# Patient Record
Sex: MALE | Race: BLACK OR AFRICAN AMERICAN | Employment: FULL TIME | URBAN - METROPOLITAN AREA
[De-identification: names, ages, dates, MRNs, and addresses within clinical notes are randomized per-mention and may not be internally consistent; named-entity substitution may affect disease eponyms.]

---

## 2018-02-25 ENCOUNTER — OFFICE VISIT (OUTPATIENT)
Dept: URGENT CARE | Facility: URGENT CARE | Age: 43
End: 2018-02-25
Payer: COMMERCIAL

## 2018-02-25 VITALS
HEART RATE: 75 BPM | DIASTOLIC BLOOD PRESSURE: 71 MMHG | TEMPERATURE: 97.3 F | WEIGHT: 219.5 LBS | OXYGEN SATURATION: 98 % | SYSTOLIC BLOOD PRESSURE: 120 MMHG | BODY MASS INDEX: 28.66 KG/M2

## 2018-02-25 DIAGNOSIS — R21 RASH: Primary | ICD-10-CM

## 2018-02-25 PROCEDURE — 99213 OFFICE O/P EST LOW 20 MIN: CPT | Performed by: FAMILY MEDICINE

## 2018-02-25 RX ORDER — CEPHALEXIN 500 MG/1
500 CAPSULE ORAL 3 TIMES DAILY
Qty: 21 CAPSULE | Refills: 0 | Status: SHIPPED | OUTPATIENT
Start: 2018-02-25 | End: 2018-03-04

## 2018-02-25 NOTE — NURSING NOTE
"Chief Complaint   Patient presents with     Musculoskeletal Problem     Pain in both legs from the knee down, started last Sunday, bump on left ankle last week, then got worse, right leg started with pain three days ago, both lower legs are itchy, irritated and swollen, can feel bumps in under the surface of the skin, was in [place] for 6 days, there Feb 16-22nd       Initial /71 (BP Location: Left arm, Patient Position: Chair, Cuff Size: Adult Regular)  Pulse 75  Temp 97.3  F (36.3  C) (Oral)  Wt 219 lb 8 oz (99.6 kg)  SpO2 98%  BMI 28.66 kg/m2 Estimated body mass index is 28.66 kg/(m^2) as calculated from the following:    Height as of 10/20/16: 6' 1.39\" (1.864 m).    Weight as of this encounter: 219 lb 8 oz (99.6 kg).  Medication Reconciliation: complete   Izzy Mendenhall MA    "

## 2018-02-25 NOTE — MR AVS SNAPSHOT
"              After Visit Summary   2018    Olivier Cheema    MRN: [id number]           Patient Information     Date Of Birth          1975        Visit Information        Provider Department      2018 1:25 PM Denise Cottrell MD [facility]        Today's Diagnoses     Rash    -  1       Follow-ups after your visit        Who to contact     If you have questions or need follow up information about today's clinic visit or your schedule please contact [facility] directly at [phone].  Normal or non-critical lab and imaging results will be communicated to you by Xanodynehart, letter or phone within 4 business days after the clinic has received the results. If you do not hear from us within 7 days, please contact the clinic through Litigaint or phone. If you have a critical or abnormal lab result, we will notify you by phone as soon as possible.  Submit refill requests through [employer] or call your pharmacy and they will forward the refill request to us. Please allow 3 business days for your refill to be completed.          Additional Information About Your Visit        MyChart Information     [employer] lets you send messages to your doctor, view your test results, renew your prescriptions, schedule appointments and more. To sign up, go to [email or web address] . Click on \"Log in\" on the left side of the screen, which will take you to the Welcome page. Then click on \"Sign up Now\" on the right side of the page.     You will be asked to enter the access code listed below, as well as some personal information. Please follow the directions to create your username and password.     Your access code is: [email or web address]  Expires: 2018  2:05 PM     Your access code will  in 90 days. If you need help or a new code, please call your [facility] or [phone].        Care EveryWhere ID     This is your Care EveryWhere ID. This could be used by other " organizations to access your [facility] medical records  [id number]        Your Vitals Were     Pulse Temperature Pulse Oximetry BMI (Body Mass Index)          75 97.3  F (36.3  C) (Oral) 98% 28.66 kg/m2         Blood Pressure from Last 3 Encounters:   02/25/18 120/71   10/20/16 122/78    Weight from Last 3 Encounters:   02/25/18 219 lb 8 oz (99.6 kg)   10/20/16 220 lb 12.8 oz (100.2 kg)              Today, you had the following     No orders found for display         Today's Medication Changes          These changes are accurate as of 2/25/18  2:05 PM.  If you have any questions, ask your nurse or doctor.               Start taking these medicines.        Dose/Directions    cephALEXin 500 MG capsule   Commonly known as:  KEFLEX   Used for:  Rash   Started by:  Denise Cottrell MD        Dose:  500 mg   Take 1 capsule (500 mg) by mouth 3 times daily for 7 days   Quantity:  21 capsule   Refills:  0            Where to get your medicines      These medications were sent to [facility] [address] [place] [address] [address] [facility]  [address] [facility] [address]    Hours:  24-hours Phone:  [phone]     cephALEXin 500 MG capsule                Primary Care Provider Office Phone # Fax #    Wilfred Elliott [phone]       [address]  [place] [address]        Equal Access to Services     [facility] AH: Hadii aad ku hadasho Soomaali, waaxda luqadaha, qaybta kaalmada adeegyada, waxay viv allen . So [facility] [phone].    ATENCIÓN: Si habla español, tiene a noriega disposición servicios gratuitos de asistencia lingüística. Llame al [phone].    We comply with applicable federal civil rights laws and [place] laws. We do not discriminate on the basis of race, color, national origin, age, disability, sex, sexual orientation, or gender identity.            Thank you!     Thank you for choosing [facility]  for your care. Our goal is always to provide you with excellent care. Hearing back from our patients is one way we can continue to improve our services. Please take a few minutes to complete the written survey that you may receive in the mail after your visit with us. Thank you!             Your Updated Medication List - Protect others around you: Learn how to safely use, store and throw away your medicines at www.disposemymeds.org.          This list is accurate as of 2/25/18  2:05 PM.  Always use your most recent med list.                   Brand Name Dispense Instructions for use Diagnosis    cephALEXin 500 MG capsule    KEFLEX    21 capsule    Take 1 capsule (500 mg) by mouth 3 times daily for 7 days    Rash

## 2018-02-25 NOTE — PROGRESS NOTES
Chief Complaint   Patient presents with     Musculoskeletal Problem     Pain in both legs from the knee down, started last Sunday, bump on left ankle last week, then got worse, right leg started with pain three days ago, both lower legs are itchy, irritated and swollen, can feel bumps in under the surface of the skin, was in [place] for 6 days, there Feb 16-22nd, indicated it hurts to walk      ADDITIONAL HPI: 42 year old male here for above issue.  He did spend time walking on the beach but not in the water.    ROS: 10 point review of systems negative except as per HPI.    PAST MEDICAL HISTORY:  No past medical history on file.     ACTIVE MEDICAL PROBLEMS:  Patient Active Problem List   Diagnosis     CARDIOVASCULAR SCREENING; LDL GOAL LESS THAN 160        FAMILY HISTORY:  No family history on file.    SOCIAL HISTORY:  Social History     Social History     Marital status: Single     Spouse name: N/A     Number of children: N/A     Years of education: N/A     Occupational History     Not on file.     Social History Main Topics     Smoking status: Current Every Day Smoker     Smokeless tobacco: Never Used     Alcohol use No     Drug use: No     Sexual activity: Not on file     Other Topics Concern     Not on file     Social History Narrative       MEDICATIONS:  Current Outpatient Prescriptions   Medication Sig Dispense Refill     cephALEXin (KEFLEX) 500 MG capsule Take 1 capsule (500 mg) by mouth 3 times daily for 7 days 21 capsule 0       ALLERGIES:   No Known Allergies    Problem list, Medication list, Allergies, and Medical/Social/Surgical histories reviewed in EPIC and updated as appropriate.    OBJECTIVE:                                                    VITALS: /71 (BP Location: Left arm, Patient Position: Chair, Cuff Size: Adult Regular)  Pulse 75  Temp 97.3  F (36.3  C) (Oral)  Wt 219 lb 8 oz (99.6 kg)  SpO2 98%  BMI 28.66 kg/m2 Body mass index is 28.66 kg/(m^2).  GENERAL: Pleasant, well appearing  male.  SKIN:  Tender erythematous nodules with small central brown spot/superficial ulcer.    ASSESSMENT/PLAN:                                                    1. Rash  Suspect infected sand flea bites. Cover lower legs but not on feet or ankles - which were covered with socks while walking on the beach.  Treat with keflex. Can use topical cortaid cream as well. Typically is self limited so should resolve. Follow-up if not improving or if worsening.   - cephALEXin (KEFLEX) 500 MG capsule; Take 1 capsule (500 mg) by mouth 3 times daily for 7 days  Dispense: 21 capsule; Refill: 0

## 2018-02-25 NOTE — LETTER
[facility]  [address]  [facility] [address]  Phone: [phone]    February 25, 2018        Olivier Cheema  [address]   [place] [address]          To whom it may concern:    RE: Olivier Cheema    Patient was seen and treated today at our clinic and missed work 2/26/18.    Please contact me for questions or concerns.      Sincerely,        Denise Cottrell MD

## 2019-04-24 ENCOUNTER — OFFICE VISIT (OUTPATIENT)
Dept: FAMILY MEDICINE | Facility: CLINIC | Age: 44
End: 2019-04-24
Payer: COMMERCIAL

## 2019-04-24 VITALS
RESPIRATION RATE: 18 BRPM | BODY MASS INDEX: 27.94 KG/M2 | SYSTOLIC BLOOD PRESSURE: 122 MMHG | HEART RATE: 89 BPM | TEMPERATURE: 98 F | OXYGEN SATURATION: 97 % | WEIGHT: 214 LBS | DIASTOLIC BLOOD PRESSURE: 70 MMHG

## 2019-04-24 DIAGNOSIS — M54.6 PAIN IN THORACIC SPINE: Primary | ICD-10-CM

## 2019-04-24 PROCEDURE — 99213 OFFICE O/P EST LOW 20 MIN: CPT | Performed by: PHYSICIAN ASSISTANT

## 2019-04-24 RX ORDER — CYCLOBENZAPRINE HCL 5 MG
5 TABLET ORAL 3 TIMES DAILY PRN
Qty: 12 TABLET | Refills: 0 | Status: SHIPPED | OUTPATIENT
Start: 2019-04-24 | End: 2021-03-30

## 2019-04-24 RX ORDER — NAPROXEN 500 MG/1
500 TABLET ORAL 2 TIMES DAILY WITH MEALS
Qty: 30 TABLET | Refills: 1 | Status: SHIPPED | OUTPATIENT
Start: 2019-04-24 | End: 2021-03-30

## 2019-04-24 RX ORDER — NAPROXEN 500 MG/1
500 TABLET ORAL 2 TIMES DAILY WITH MEALS
Qty: 30 TABLET | Refills: 1 | Status: SHIPPED | OUTPATIENT
Start: 2019-04-24 | End: 2019-04-24

## 2019-04-24 RX ORDER — CYCLOBENZAPRINE HCL 5 MG
5 TABLET ORAL 3 TIMES DAILY PRN
Qty: 12 TABLET | Refills: 0 | Status: SHIPPED | OUTPATIENT
Start: 2019-04-24 | End: 2019-04-24

## 2019-04-24 ASSESSMENT — PAIN SCALES - GENERAL: PAINLEVEL: MODERATE PAIN (4)

## 2019-04-24 NOTE — PROGRESS NOTES
SUBJECTIVE:   Olivier Cheema is a 43 year old male who presents to clinic today for the following   health issues:      Back Pain       Duration: x 1 week        Specific cause: none    Description:   Location of pain: middle of back left  Character of pain: dull ache  Pain radiation:none  New numbness or weakness in legs, not attributed to pain:  no     Intensity: moderate    History:   Pain interferes with job: No  History of back problems: no prior back problems  Any previous MRI or X-rays: None  Sees a specialist for back pain:  No  Therapies tried without relief: ibuprofen, cold heat      Alleviating factors:     Improved by: nothing    Precipitating factors:  Worsened by: Bending and Lying Flat        Accompanying Signs & Symptoms:  Risk of Fracture:  None  Risk of Cauda Equina:  None  Risk of Infection:  None  Risk of Cancer:  None  Risk of Ankylosing Spondylitis:  Onset at age <35, male, AND morning back stiffness. 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  Patient localizes pain to L back below scapula.     ROS:  Constitutional, HEENT, cardiovascular, pulmonary, gi and gu systems are negative, except as otherwise noted.    OBJECTIVE:     /70   Pulse 89   Temp 98  F (36.7  C) (Oral)   Resp 18   Wt 97.1 kg (214 lb)   SpO2 97%   BMI 27.94 kg/m    Body mass index is 27.94 kg/m .  RESP/Chest: lungs clear to auscultation - no rales, rhonchi or wheezes, No pain with palpation.    MS: no gross musculoskeletal defects noted, no edema  SKIN: no suspicious lesions or rashes    Diagnostic Test Results:  none     ASSESSMENT/PLAN:   1. Pain in thoracic spine  - cyclobenzaprine (FLEXERIL) 5 MG tablet; Take 1 tablet (5 mg) by mouth 3 times daily as needed for muscle spasms  Dispense: 12 tablet; Refill: 0  - naproxen (NAPROSYN) 500 MG tablet; Take 1 tablet (500 mg) by mouth 2 times daily (with meals)  Dispense: 30 tablet; Refill: 1    Ice 20 min 3 x  daily  Use medication as directed.  Follow up if symptoms should persist, change or worsen.  Patient amenable to this follow up plan.     Delvin Young PA-C  [facility]

## 2019-04-24 NOTE — PATIENT INSTRUCTIONS
Patient Education     Back Pain (Acute or Chronic)    Back pain is one of the most common problems. The good news is that most people feel better in 1 to 2 weeks, and most of the rest in 1 to 2 months. Most people can remain active.  People experience and describe pain differently; not everyone is the same.    The pain can be sharp, stabbing, shooting, aching, cramping or burning.    Movement, standing, bending, lifting, sitting, or walking may worsen pain.    It can be localized to one spot or area, or it can be more generalized.    It can spread or radiate upwards, to the front, or go down your arms or legs (sciatica).    It can cause muscle spasm.  Most of the time, mechanical problems with the muscles or spine cause the pain. Mechanical problems are usually caused by an injury to the muscles or ligaments. While illness can cause back pain, it is usually not caused by a serious illness. Mechanical problems include:     Physical activity such as sports, exercise, work, or normal activity    Overexertion, lifting, pushing, pulling incorrectly or too aggressively    Sudden twisting, bending, or stretching from an accident, or accidental movement    Poor posture    Stretching or moving wrong, without noticing pain at the time    Poor coordination, lack of regular exercise (check with your doctor about this)    Spinal disc disease or arthritis    Stress  Pain can also be related to pregnancy, or illness like appendicitis, bladder or kidney infections, pelvic infections, and many other things.  Acute back pain usually gets better in 1 to 2 weeks. Back pain related to disk disease, arthritis in the spinal joints or spinal stenosis (narrowing of the spinal canal) can become chronic and last for months or years.  Unless you had a physical injury (for example, a car accident or fall) X-rays are usually not needed for the initial evaluation of back pain. If pain continues and does not respond to medical treatment, X-rays  and other tests may be needed.  Home care  Try these home care recommendations:    When in bed, try to find a position of comfort. A firm mattress is best. Try lying flat on your back with pillows under your knees. You can also try lying on your side with your knees bent up towards your chest and a pillow between your knees.    At first, do not try to stretch out the sore spots. If there is a strain, it is not like the good soreness you get after exercising without an injury. In this case, stretching may make it worse.    Avoid prolong sitting, long car rides, or travel. This puts more stress on the lower back than standing or walking.    During the first 24 to 72 hours after an acute injury or flare up of chronic back pain, apply an ice pack to the painful area for 20 minutes and then remove it for 20 minutes. Do this over a period of 60 to 90 minutes or several times a day. This will reduce swelling and pain. Wrap the ice pack in a thin towel or plastic to protect your skin.    You can start with ice, then switch to heat. Heat (hot shower, hot bath, or heating pad) reduces pain and works well for muscle spasms. Heat can be applied to the painful area for 20 minutes then remove it for 20 minutes. Do this over a period of 60 to 90 minutes or several times a day. Do not sleep on a heating pad. It can lead to skin burns or tissue damage.    You can alternate ice and heat therapy. Talk with your doctor about the best treatment for your back pain.    Therapeutic massage can help relax the back muscles without stretching them.    Be aware of safe lifting methods and do not lift anything without stretching first.  Medicines  Talk to your doctor before using medicine, especially if you have other medical problems or are taking other medicines.    You may use over-the-counter medicine as directed on the bottle to control pain, unless another pain medicine was prescribed. If you have chronic conditions like diabetes, liver  or kidney disease, stomach ulcers, or gastrointestinal bleeding, or are taking blood thinners, talk to your doctor before taking any medicine.    Be careful if you are given a prescription medicines, narcotics, or medicine for muscle spasms. They can cause drowsiness, affect your coordination, reflexes, and judgement. Do not drive or operate heavy machinery.  Follow-up care  Follow up with your healthcare provider, or as advised.   A radiologist will review any X-rays that were taken. Your provide will notify you of any new findings that may affect your care.  Call 911  Call emergency services if any of the following occur:    Trouble breathing    Confusion    Very drowsy or trouble awakening    Fainting or loss of consciousness    Rapid or very slow heart rate    Loss of bowel or bladder control  When to seek medical advice  Call your healthcare provider right away if any of these occur:     Pain becomes worse or spreads to your legs    Weakness or numbness in one or both legs    Numbness in the groin or genital area  Date Last Reviewed: 7/1/2016 2000-2017 [employer]. [address] [place] [address]. All rights reserved. This information is not intended as a substitute for professional medical care. Always follow your healthcare professional's instructions.

## 2019-08-23 ENCOUNTER — OFFICE VISIT (OUTPATIENT)
Dept: FAMILY MEDICINE | Facility: CLINIC | Age: 44
End: 2019-08-23
Payer: COMMERCIAL

## 2019-08-23 VITALS
SYSTOLIC BLOOD PRESSURE: 108 MMHG | DIASTOLIC BLOOD PRESSURE: 70 MMHG | OXYGEN SATURATION: 99 % | HEART RATE: 74 BPM | TEMPERATURE: 98.5 F | WEIGHT: 230 LBS | BODY MASS INDEX: 30.03 KG/M2

## 2019-08-23 DIAGNOSIS — B34.9 VIRAL SYNDROME: Primary | ICD-10-CM

## 2019-08-23 DIAGNOSIS — H61.23 BILATERAL IMPACTED CERUMEN: ICD-10-CM

## 2019-08-23 DIAGNOSIS — F17.200 TOBACCO USE DISORDER: ICD-10-CM

## 2019-08-23 PROBLEM — E04.9 GOITER: Status: ACTIVE | Noted: 2019-08-23

## 2019-08-23 PROCEDURE — 90715 TDAP VACCINE 7 YRS/> IM: CPT | Performed by: NURSE PRACTITIONER

## 2019-08-23 PROCEDURE — 90471 IMMUNIZATION ADMIN: CPT | Performed by: NURSE PRACTITIONER

## 2019-08-23 PROCEDURE — 69209 REMOVE IMPACTED EAR WAX UNI: CPT | Mod: 50 | Performed by: NURSE PRACTITIONER

## 2019-08-23 PROCEDURE — 99213 OFFICE O/P EST LOW 20 MIN: CPT | Mod: 25 | Performed by: NURSE PRACTITIONER

## 2019-08-23 ASSESSMENT — PAIN SCALES - GENERAL: PAINLEVEL: NO PAIN (0)

## 2019-08-23 NOTE — PATIENT INSTRUCTIONS
"  Patient Education     Viral Syndrome (Adult)  A viral illness may cause a number of symptoms such as fever. Other symptoms depend on the part of the body that the virus affects. If it settles in your nose, throat, and lungs, it may cause cough, sore throat, congestion, runny nose, headache, earache and other ear symptoms, or shortness of breath. If it settles in your stomach and intestinal tract, it may cause nausea, vomiting, cramping, and diarrhea. Sometimes it causes generalized symptoms like \"aching all over,\" feeling tired, loss of energy, or loss of appetite.  A viral illness usually lasts anywhere from several days to several weeks, but sometimes it lasts longer. In some cases, a more serious infection can look like a viral syndrome in the first few days of the illness. You may need another exam and additional tests to know the difference. Watch for the warning signs listed below for when to seek medical advice.  Home care  Follow these guidelines for taking care of yourself at home:    If symptoms are severe, rest at home for the first 2 to 3 days.    Stay away from cigarette smoke - both your smoke and the smoke from others.    You may use over-the-counter acetaminophen or ibuprofen for fever, muscle aching, and headache, unless another medicine was prescribed for this. If you have chronic liver or kidney disease or ever had a stomach ulcer or gastrointestinal bleeding, talk with your healthcare provider before using these medicines. No one who is younger than 18 and ill with a fever should take aspirin. It may cause severe disease or death.    Your appetite may be poor, so a light diet is fine. Avoid dehydration by drinking 8 to 12, 8-ounce glasses of fluids each day. This may include water; orange juice; lemonade; apple, grape, and cranberry juice; clear fruit drinks; electrolyte replacement and sports drinks; and decaffeinated teas and coffee. If you have been diagnosed with a kidney disease, ask your " healthcare provider how much and what types of fluids you should drink to prevent dehydration. If you have kidney disease, drinking too much fluid can cause it build up in the your body and be dangerous to your health.    Over-the-counter remedies won't shorten the length of the illness but may be helpful for symptoms such as cough, sore throat, nasal and sinus congestion, or diarrhea. Don't use decongestants if you have high blood pressure.  Follow-up care  Follow up with your healthcare provider if you do not improve over the next week.  Call 911  Call 911 if any of the following occur:    Convulsion    Feeling weak, dizzy, or like you are going to faint    Chest pain, or more than mild shortness of breath  When to seek medical advice  Call your healthcare provider right away if any of these occur:    Cough with lots of colored sputum (mucus) or blood in your sputum    Chest pain, shortness of breath, wheezing, or trouble breathing    Severe headache; face, neck, or ear pain    Severe, constant pain in the lower right side of your belly (abdominal)    Continued vomiting (can t keep liquids down)    Frequent diarrhea (more than 5 times a day); blood (red or black color) or mucus in diarrhea    Feeling weak, dizzy, or like you are going to faint    Extreme thirst    Fever of 100.4 F (38 C) or higher, or as directed by your healthcare provider  Date Last Reviewed: 4/1/2018 2000-2018 [employer]. [address] [place] [address]. All rights reserved. This information is not intended as a substitute for professional medical care. Always follow your healthcare professional's instructions.           Afrin nasal spray to decongest the nose    [facility]     Discharged by : Nuzhat Gaona CMA     If you have any questions regarding your visit please contact your care team:     Team [facility]              Clinic Hours Telephone Number     Dr. Navneet Gustafson  Clarence Florentino, CNP   7am-7pm  Monday - Thursday   7am-5pm  Fridays  [phone]   (Appointment scheduling available 24/7)     RN Line  [phone] option 2     Urgent [facility] - [place] - 11am-9pm Monday-Friday Saturday-Sunday- 9am-5pm     [place] -   5pm-9pm Monday-Friday Saturday-Sunday- 9am-5pm    [phone] - [place]    [phone] - [place]     For a Price Quote for your services, please call our Consumer Price Line at [phone].     What options do I have for visits at the clinic other than the traditional office visit?     To expand how we care for you, many of our providers are utilizing electronic visits (e-visits) and telephone visits, when medically appropriate, for interactions with their patients rather than a visit in the clinic. We also offer nurse visits for many medical concerns. Just like any other service, we will bill your insurance company for this type of visit based on time spent on the phone with your provider. Not all insurance companies cover these visits. Please check with your medical insurance if this type of visit is covered. You will be responsible for any charges that are not paid by your insurance.     E-visits via [employer]: generally incur a $45.00 fee.     Telephone visits:  Time spent on the phone: *charged based on time that is spent on the phone in increments of 10 minutes. Estimated cost:   5-10 mins $30.00   11-20 mins. $59.00   21-30 mins. $85.00       Use [employer] (secure email communication and access to your chart) to send your primary care provider a message or make an appointment. Ask someone on your Team how to sign up for [employer].     As always, Thank you for trusting us with your health care needs!      [facility]:    Scheduling Appointments  [facility]  Call: [phone]    [facility]  Call: [phone]    [facility]  Locations  Call: [phone]    For Gastroenterology referrals   [facility]   Clinics and Surgery Center, 4th Floor   [address] [place] [address]   Appointments: [phone]    WHERE TO GO FOR CARE?    Clinic    Make an appointment if you:       Are sick (cold, cough, flu, sore throat, earache or in pain).       Have a small injury (sprain, small cut, burn or broken bone).       Need a physical exam, Pap smear, vaccine or prescription refill.       Have questions about your health or medicines.    To reach us:      Call [phone] ([phone]). Open 24 hours every day. (For counseling services, call [phone].)    Log into [employer]. (Visit [employer] to create an account.) Hospital emergency room    An emergency is a serious or life- threatening problem that must be treated right away.    Call 953 or get to the hospital if you have:      Very bad or sudden:            - Chest pain or pressure         - Bleeding         - Head or belly pain         - Dizziness or trouble seeing, walking or                          Speaking      Problems breathing      Blood in your vomit or you are coughing up blood      A major injury (knocked out, loss of a finger or limb, rape, broken bone protruding from skin)    A mental health crisis. (Or call the Mental Health Crisis line at 1-925.874.5421 or Suicide Prevention Hotline at 1-134.585.5289.)    Open 24 hours every day. You don't need an appointment.     Urgent care    Visit urgent care for sickness or small injuries when the clinic is closed. You don't need an appointment. To check hours or find an urgent care near you, visit [email or web address]. Online care    Get online care from [facility] for more than 70 common problems, like colds, allergies and infections. Open 24 hours every day at:   [email or web address]   Need help deciding?    For advice about where to be seen, you may call your clinic and ask to speak with  a nurse. We're here for you 24 hours every day.         If you are deaf or hard of hearing, please let us know. We provide many free services including sign language interpreters, oral interpreters, TTYs, telephone amplifiers, note takers and written materials.

## 2019-08-23 NOTE — NURSING NOTE
Bilateral ear wash done.     Screening Questionnaire for Adult Immunization    Are you sick today?   No   Do you have allergies to medications, food, a vaccine component or latex?   No   Have you ever had a serious reaction after receiving a vaccination?   No   Do you have a long-term health problem with heart disease, lung disease, asthma, kidney disease, metabolic disease (e.g. diabetes), anemia, or other blood disorder?   No   Do you have cancer, leukemia, HIV/AIDS, or any other immune system problem?   No   In the past 3 months, have you taken medications that affect  your immune system, such as prednisone, other steroids, or anticancer drugs; drugs for the treatment of rheumatoid arthritis, Crohn s disease, or psoriasis; or have you had radiation treatments?   No   Have you had a seizure, or a brain or other nervous system problem?   No   During the past year, have you received a transfusion of blood or blood     products, or been given immune (gamma) globulin or antiviral drug?   No   For women: Are you pregnant or is there a chance you could become        pregnant during the next month?   No   Have you received any vaccinations in the past 4 weeks?   No     Immunization questionnaire answers were all negative.        Per orders of. DREA Odonnell, injection of Tdap  given by Nuzhat Gaona MA. Patient instructed to remain in clinic for 15 minutes afterwards, and to report any adverse reaction to me immediately.       Screening performed by Nuzhat Gaona MA on 8/23/2019 at 11:47 AM.      Nuzhat Gaona CMA

## 2019-08-23 NOTE — LETTER
[facility]  [address] [place] [id number]  [phone]          August 23, 2019    RE:  Olivier NOE Deni                                                                                                                                                       [address]  [place] [address]            To whom it may concern:    Olivier NOE Dein is under my professional care and was seen by me today 8/23/19.  Please excuse Olivier from work 8/22/19 and 8/23/19.      Sincerely,             Juliana Florentino, DNP, APRN, CNP

## 2019-08-23 NOTE — PROGRESS NOTES
Subjective     Olivier Cheema is a 44 year old male who presents to clinic today for the following health issues:    HPI   ENT Symptoms             Symptoms: cc Present Absent Comment   Fever/Chills  x     Fatigue  x     Muscle Aches  x     Eye Irritation   x    Sneezing  x     Nasal Idris/Drg  x     Sinus Pressure/Pain  x     Loss of smell  x     Dental pain  x     Sore Throat  x  Scratchy not painful   Swollen Glands   x    Ear Pain/Fullness  x  Left ear feels cloudy   Cough  x     Wheeze  x  A little bit late at night   Chest Pain   x    Shortness of breath  x  At times, hard to breath through the nose.  Mouth breather today.   Rash   x    Other  x  Vomiting- could not keep foods down well     Symptom duration:  5 days   Symptom severity:  Mild   Treatments tried:  cough drops, cold medicine (generic of dayquil)   Contacts:  yes- wife with similar symptoms.  8 month old child had symptoms but when evaluated at the clinic was told it was allergies     Started with congestion.  Stayed home Monday, half day Wednesday, then skipped work yesterday and plans to skip today.  Works 3-11:30 pm.  Body aches, chills at night.  A little phlegm he coughed up this morning.  Feel worse each day.    Patient Active Problem List   Diagnosis     CARDIOVASCULAR SCREENING; LDL GOAL LESS THAN 160     Goiter     Tobacco use disorder     History reviewed. No pertinent surgical history.    Social History     Tobacco Use     Smoking status: Current Every Day Smoker     Smokeless tobacco: Never Used   Substance Use Topics     Alcohol use: No     Family History   Problem Relation Age of Onset     No Known Problems Mother      Throat cancer Father      Asthma Other         cousin     Lung Cancer Maternal Uncle         smoker     Thyroid Disease No family hx of      Prostate Cancer No family hx of          Current Outpatient Medications   Medication Sig Dispense Refill     cyclobenzaprine (FLEXERIL) 5 MG tablet Take 1 tablet (5 mg) by  mouth 3 times daily as needed for muscle spasms (Patient not taking: Reported on 8/23/2019) 12 tablet 0     naproxen (NAPROSYN) 500 MG tablet Take 1 tablet (500 mg) by mouth 2 times daily (with meals) (Patient not taking: Reported on 8/23/2019) 30 tablet 1     No Known Allergies  BP Readings from Last 3 Encounters:   08/23/19 108/70   04/24/19 122/70   02/25/18 120/71    Wt Readings from Last 3 Encounters:   08/23/19 104.3 kg (230 lb)   04/24/19 97.1 kg (214 lb)   02/25/18 99.6 kg (219 lb 8 oz)                    Reviewed and updated as needed this visit by Provider  Allergies  Meds  Problems  Med Hx  Surg Hx         Review of Systems   ROS COMP: Constitutional, HEENT, cardiovascular, pulmonary, gi and gu systems are negative, except as otherwise noted.      Objective    /70 (BP Location: Right arm, Patient Position: Sitting, Cuff Size: Adult Large)   Pulse 74   Temp 98.5  F (36.9  C) (Oral)   Wt 104.3 kg (230 lb)   SpO2 99%   BMI 30.03 kg/m    Body mass index is 30.03 kg/m .  Physical Exam   GENERAL: healthy, alert, no distress and over weight  EYES: Eyes grossly normal to inspection, PERRL and conjunctivae and sclerae normal  HENT: normal cephalic/atraumatic, both ears: occluded with wax.  Post ear irrigation by medical assistant the canals were clear of cerumen and TMs were dull without erythema, nose and mouth without ulcers or lesions, rhinorrhea clear, oropharynx clear and oral mucous membranes moist.  No tenderness of frontal and maxillary sinuses  NECK: no adenopathy, no asymmetry, masses, or scars and goiter  RESP: lungs clear to auscultation - no rales, rhonchi or wheezes  CV: regular rate and rhythm, normal S1 S2, no S3 or S4, no murmur, click or rub, no peripheral edema and peripheral pulses strong  PSYCH: mentation appears normal and affect normal/bright          Assessment & Plan     1. Viral syndrome  Patient has been ill with URI type symptoms for the past 5 days.  At this point most  consistent with viral etiology.  Discussed that with this would expect improvement/resolution of symptoms 7-10 days after symptom onset.  Work letter provided to miss work yesterday and today.  Patient will rest over the weekend and return to work on Monday.  Discussed use of Afrin nasal spray for up to 3 days as needed for nasal congestion.    2. Bilateral impacted cerumen  Successfully removed with ear wash by medical assistant.  - REMOVE IMPACTED CERUMEN    3. Tobacco use disorder  Advised on cutting back on smoking with goal of complete cessation.  Patient not ready to quit completely at this time.       Tobacco Cessation:   reports that he has been smoking.  He has never used smokeless tobacco.      Return in about 2 weeks (around 9/6/2019) for if symptoms worsen or fail to improve.    Juliana Florentino, NP  [facility]     stated

## 2020-01-06 ENCOUNTER — OFFICE VISIT (OUTPATIENT)
Dept: FAMILY MEDICINE | Facility: CLINIC | Age: 45
End: 2020-01-06
Payer: COMMERCIAL

## 2020-01-06 VITALS
OXYGEN SATURATION: 98 % | WEIGHT: 240.4 LBS | HEART RATE: 78 BPM | TEMPERATURE: 97.7 F | DIASTOLIC BLOOD PRESSURE: 80 MMHG | RESPIRATION RATE: 16 BRPM | BODY MASS INDEX: 30.85 KG/M2 | SYSTOLIC BLOOD PRESSURE: 124 MMHG | HEIGHT: 74 IN

## 2020-01-06 DIAGNOSIS — F17.200 TOBACCO USE DISORDER: ICD-10-CM

## 2020-01-06 DIAGNOSIS — Z13.220 SCREENING FOR HYPERLIPIDEMIA: ICD-10-CM

## 2020-01-06 DIAGNOSIS — G57.11 MERALGIA PARESTHETICA OF RIGHT SIDE: Primary | ICD-10-CM

## 2020-01-06 DIAGNOSIS — E04.9 GOITER: ICD-10-CM

## 2020-01-06 DIAGNOSIS — E04.1 THYROID NODULE: ICD-10-CM

## 2020-01-06 DIAGNOSIS — M79.89 LEG SWELLING: ICD-10-CM

## 2020-01-06 DIAGNOSIS — Z13.1 SCREENING FOR DIABETES MELLITUS: ICD-10-CM

## 2020-01-06 LAB — HBA1C MFR BLD: 5.6 % (ref 0–5.6)

## 2020-01-06 PROCEDURE — 80061 LIPID PANEL: CPT | Performed by: NURSE PRACTITIONER

## 2020-01-06 PROCEDURE — 80053 COMPREHEN METABOLIC PANEL: CPT | Performed by: NURSE PRACTITIONER

## 2020-01-06 PROCEDURE — 90471 IMMUNIZATION ADMIN: CPT | Performed by: NURSE PRACTITIONER

## 2020-01-06 PROCEDURE — 84443 ASSAY THYROID STIM HORMONE: CPT | Performed by: NURSE PRACTITIONER

## 2020-01-06 PROCEDURE — 83036 HEMOGLOBIN GLYCOSYLATED A1C: CPT | Performed by: NURSE PRACTITIONER

## 2020-01-06 PROCEDURE — 36415 COLL VENOUS BLD VENIPUNCTURE: CPT | Performed by: NURSE PRACTITIONER

## 2020-01-06 PROCEDURE — 90686 IIV4 VACC NO PRSV 0.5 ML IM: CPT | Performed by: NURSE PRACTITIONER

## 2020-01-06 PROCEDURE — 99214 OFFICE O/P EST MOD 30 MIN: CPT | Mod: 25 | Performed by: NURSE PRACTITIONER

## 2020-01-06 ASSESSMENT — MIFFLIN-ST. JEOR: SCORE: 2055.2

## 2020-01-06 NOTE — PROGRESS NOTES
"Subjective     Olivier Cheema is a 44 year old male who presents to clinic today for the following health issues:    HPI   Sensation changes    Onset: \"Awhile\"     Description:   Location: legs and ankles   Character: Burning and tingling     Intensity: moderate    Progression of Symptoms: same    Accompanying Signs & Symptoms:  Other symptoms: tingling and discoloration of skin     History:   Previous similar pain: no       Precipitating factors:   Trauma or overuse: not sure, standing a lot at work and watches daughter during the day       Alleviating factors:  Improved by: nothing    Therapies Tried and outcome: nothing     Nikole Gaona, medical assistant    Noticed dark pigmentation around his lower legs.  A little swelling.  Thinks it might be due to poor circulation.  Numb/tingling/burning sensation in the right anterior/lateral thigh.  Sometimes gets this bilateral.  Gets uncomforable when sitting in a recliner.  10 minutes or more of sitting flares the numbness.  Resolves when he gets up and walks.    Less than 1 pack per day tobacco use.  Not ready to quit.  But wants to try to quit someday.    He now works night shift.  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    Patient Active Problem List   Diagnosis     CARDIOVASCULAR SCREENING; LDL GOAL LESS THAN 160     Goiter     Tobacco use disorder     History reviewed. No pertinent surgical history.    Social History     Tobacco Use     Smoking status: Current Every Day Smoker     Smokeless tobacco: Never Used   Substance Use Topics     Alcohol use: No     Family History   Problem Relation Age of Onset     No Known Problems Mother      Throat cancer Father      Asthma Other         cousin     Lung Cancer Maternal Uncle         smoker     Thyroid Disease No family hx of      Prostate Cancer No family hx of          Current Outpatient Medications   Medication Sig Dispense Refill            cyclobenzaprine (FLEXERIL) 5 MG tablet Take 1 " "tablet (5 mg) by mouth 3 times daily as needed for muscle spasms (Patient not taking: Reported on 8/23/2019) 12 tablet 0     naproxen (NAPROSYN) 500 MG tablet Take 1 tablet (500 mg) by mouth 2 times daily (with meals) (Patient not taking: Reported on 8/23/2019) 30 tablet 1     No Known Allergies  BP Readings from Last 3 Encounters:   01/06/20 124/80   08/23/19 108/70   04/24/19 122/70    Wt Readings from Last 3 Encounters:   01/06/20 109 kg (240 lb 6.4 oz)   08/23/19 104.3 kg (230 lb)   04/24/19 97.1 kg (214 lb)                    Reviewed and updated as needed this visit by Provider  Allergies  Meds  Problems  Med Hx  Surg Hx         Review of Systems   ROS COMP: Constitutional, HEENT, cardiovascular, pulmonary, gi and gu systems are negative, except as otherwise noted.      Objective    /80 (BP Location: Right arm, Patient Position: Chair, Cuff Size: Adult Large)   Pulse 78   Temp 97.7  F (36.5  C) (Oral)   Resp 16   Ht 1.88 m (6' 2\")   Wt 109 kg (240 lb 6.4 oz)   SpO2 98%   BMI 30.87 kg/m    Body mass index is 30.87 kg/m .  Physical Exam   GENERAL: healthy, alert and no distress  HENT: normal cephalic/atraumatic and both ears: moderate cerumen  NECK: goiter  RESP: lungs clear to auscultation - no rales, rhonchi or wheezes  CV: regular rate and rhythm, normal S1 S2, no S3 or S4, no murmur, click or rub and peripheral pulses strong.  Mild swelling BLE  SKIN:  Mild hyperpigmentation lower legs  PSYCH: mentation appears normal, affect normal/bright    Diagnostic Test Results:  Labs reviewed in Epic  Results for orders placed or performed in visit on 01/06/20   Hemoglobin A1c     Status: None   Result Value Ref Range    Hemoglobin A1C 5.6 0 - 5.6 %           Assessment & Plan     1. Meralgia paresthetica, unspecified laterality  Mild.  Discussed condition with patient.  Screen for diabetes today.  Advised on weight loss.    2. Leg swelling    - order for DME; Equipment being ordered: knee high " "compression stockings 15-20 mmHg  Dispense: 2 each; Refill: 0  - Comprehensive metabolic panel (BMP + Alb, Alk Phos, ALT, AST, Total. Bili, TP)    3. Goiter  Patient reports h/o of this but has not been monitoring it.  Recommend following up with labs and ultrasound for monitoring.  - TSH with free T4 reflex  - US Thyroid; Future    4. Tobacco use disorder  Advised on cessation, patient admits he is not quite ready.    5. Screening for hyperlipidemia    - Lipid panel reflex to direct LDL Fasting    6. Screening for diabetes mellitus    - Hemoglobin A1c     Tobacco Cessation:   reports that he has been smoking. He has never used smokeless tobacco.  Tobacco Cessation Action Plan: Information offered: Patient not interested at this time      BMI:   Estimated body mass index is 30.87 kg/m  as calculated from the following:    Height as of this encounter: 1.88 m (6' 2\").    Weight as of this encounter: 109 kg (240 lb 6.4 oz).   Weight management plan: Discussed healthy diet and exercise guidelines        Return in about 3 months (around 4/6/2020) for Physical Exam.    Juliana Florentino, NP  [facility]      "

## 2020-01-06 NOTE — LETTER
"January 8, 2020      Olivier NOE Deni  [address]  [place] [address]        Dear Olivier,     Your thyroid level is within normal limits.   Your diabetic screening is negative!   Your kidney function is slightly reduced and I recommend rechecking this in about 6 months to monitor.   Your liver function and electrolytes are within normal limits.   Your cholesterol levels overall are good.  The \"good\" HDL level is low and I recommend getting more aerobic exercise to improve this.   Enclosed are your results.     Results for orders placed or performed in visit on 01/06/20   Lipid panel reflex to direct LDL Fasting     Status: Abnormal   Result Value Ref Range    Cholesterol 93 <200 mg/dL    Triglycerides 158 (H) <150 mg/dL    HDL Cholesterol 29 (L) >39 mg/dL    LDL Cholesterol Calculated 32 <100 mg/dL    Non HDL Cholesterol 64 <130 mg/dL   Hemoglobin A1c     Status: None   Result Value Ref Range    Hemoglobin A1C 5.6 0 - 5.6 %   TSH with free T4 reflex     Status: None   Result Value Ref Range    TSH 3.47 0.40 - 4.00 mU/L   Comprehensive metabolic panel (BMP + Alb, Alk Phos, ALT, AST, Total. Bili, TP)     Status: Abnormal   Result Value Ref Range    Sodium 141 133 - 144 mmol/L    Potassium 3.8 3.4 - 5.3 mmol/L    Chloride 106 94 - 109 mmol/L    Carbon Dioxide 28 20 - 32 mmol/L    Anion Gap 7 3 - 14 mmol/L    Glucose 99 70 - 99 mg/dL    Urea Nitrogen 11 7 - 30 mg/dL    Creatinine 1.34 (H) 0.66 - 1.25 mg/dL    GFR Estimate 64 >60 mL/min/[1.73_m2]    GFR Estimate If Black 74 >60 mL/min/[1.73_m2]    Calcium 8.8 8.5 - 10.1 mg/dL    Bilirubin Total 0.3 0.2 - 1.3 mg/dL    Albumin 3.8 3.4 - 5.0 g/dL    Protein Total 7.3 6.8 - 8.8 g/dL    Alkaline Phosphatase 109 40 - 150 U/L    ALT 48 0 - 70 U/L    AST 20 0 - 45 U/L     If you have any questions or concerns please feel free to contact me at the office at [phone] or via [email or web address].     Sincerely,    Juliana Florentino, DNP, APRN, CNP /kb  "

## 2020-01-06 NOTE — PATIENT INSTRUCTIONS
[facility]     Discharged by : Nuzhat Gaona CMA     If you have any questions regarding your visit please contact your care team:     Team [facility]              Clinic Hours Telephone Number     Dr. Navneet Florentino, CNP   7am-7pm  Monday - Thursday   7am-5pm  Fridays  [phone]   (Appointment scheduling available 24/7)     RN Line  [phone] option 2     Urgent Care - [place] - 11am-9pm Monday-Friday Saturday-Sunday- 9am-5pm     [place] -   5pm-9pm Monday-Friday Saturday-Sunday- 9am-5pm    [phone] - [place]    [phone] - [place]     For a Price Quote for your services, please call our [employer] Price Line at [phone].     What options do I have for visits at the clinic other than the traditional office visit?     To expand how we care for you, many of our providers are utilizing electronic visits (e-visits) and telephone visits, when medically appropriate, for interactions with their patients rather than a visit in the clinic. We also offer nurse visits for many medical concerns. Just like any other service, we will bill your insurance company for this type of visit based on time spent on the phone with your provider. Not all insurance companies cover these visits. Please check with your medical insurance if this type of visit is covered. You will be responsible for any charges that are not paid by your insurance.     E-visits via [employer]: generally incur a $45.00 fee.     Telephone visits:  Time spent on the phone: *charged based on time that is spent on the phone in increments of 10 minutes. Estimated cost:   5-10 mins $30.00   11-20 mins. $59.00   21-30 mins. $85.00       Use [employer] (secure email communication and access to your chart) to send your primary care provider a message or make an appointment. Ask someone on your Team how to sign up for [employer].     As always, Thank you for trusting  us with your health care needs!      [facility]:    Scheduling Appointments  [facility]  Call: [phone]    [facility]  Call: [phone]    [facility]  Call: [phone]    For Gastroenterology referrals   [facility]   Clinics and Surgery Center, 4th Floor   [address] [place] [address]   Appointments: [phone]    WHERE TO GO FOR CARE?    Clinic    Make an appointment if you:       Are sick (cold, cough, flu, sore throat, earache or in pain).       Have a small injury (sprain, small cut, burn or broken bone).       Need a physical exam, Pap smear, vaccine or prescription refill.       Have questions about your health or medicines.    To reach us:      Call [phone] ([phone]). Open 24 hours every day. (For counseling services, call [phone].)    Log into [employer] at [employer]. (Visit [email or web address] to create an account.) Hospital emergency room    An emergency is a serious or life- threatening problem that must be treated right away.    Call 096 or get to the hospital if you have:      Very bad or sudden:            - Chest pain or pressure         - Bleeding         - Head or belly pain         - Dizziness or trouble seeing, walking or                          Speaking      Problems breathing      Blood in your vomit or you are coughing up blood      A major injury (knocked out, loss of a finger or limb, rape, broken bone protruding from skin)    A mental health crisis. (Or call the Mental Health Crisis line at 1-930.143.2929 or Suicide Prevention Hotline at 1-161.612.3974.)    Open 24 hours every day. You don't need an appointment.     Urgent care    Visit urgent care for sickness or small injuries when the clinic is closed. You don't need an appointment. To check hours or find an urgent care near you, visit [email or web address]. Online care    Get  [facility] from [facility] for more than 70 common problems, like colds, allergies and infections. Open 24 hours every day at:   [email or web address]   Need help deciding?    For advice about where to be seen, you may call your clinic and ask to speak with a nurse. We're here for you 24 hours every day.         If you are deaf or hard of hearing, please let us know. We provide many free services including sign language interpreters, oral interpreters, TTYs, telephone amplifiers, note takers and written materials.

## 2020-01-07 ENCOUNTER — ANCILLARY PROCEDURE (OUTPATIENT)
Dept: ULTRASOUND IMAGING | Facility: CLINIC | Age: 45
End: 2020-01-07
Attending: NURSE PRACTITIONER
Payer: COMMERCIAL

## 2020-01-07 DIAGNOSIS — E04.9 GOITER: ICD-10-CM

## 2020-01-07 LAB
ALBUMIN SERPL-MCNC: 3.8 G/DL (ref 3.4–5)
ALP SERPL-CCNC: 109 U/L (ref 40–150)
ALT SERPL W P-5'-P-CCNC: 48 U/L (ref 0–70)
ANION GAP SERPL CALCULATED.3IONS-SCNC: 7 MMOL/L (ref 3–14)
AST SERPL W P-5'-P-CCNC: 20 U/L (ref 0–45)
BILIRUB SERPL-MCNC: 0.3 MG/DL (ref 0.2–1.3)
BUN SERPL-MCNC: 11 MG/DL (ref 7–30)
CALCIUM SERPL-MCNC: 8.8 MG/DL (ref 8.5–10.1)
CHLORIDE SERPL-SCNC: 106 MMOL/L (ref 94–109)
CHOLEST SERPL-MCNC: 93 MG/DL
CO2 SERPL-SCNC: 28 MMOL/L (ref 20–32)
CREAT SERPL-MCNC: 1.34 MG/DL (ref 0.66–1.25)
GFR SERPL CREATININE-BSD FRML MDRD: 64 ML/MIN/{1.73_M2}
GLUCOSE SERPL-MCNC: 99 MG/DL (ref 70–99)
HDLC SERPL-MCNC: 29 MG/DL
LDLC SERPL CALC-MCNC: 32 MG/DL
NONHDLC SERPL-MCNC: 64 MG/DL
POTASSIUM SERPL-SCNC: 3.8 MMOL/L (ref 3.4–5.3)
PROT SERPL-MCNC: 7.3 G/DL (ref 6.8–8.8)
SODIUM SERPL-SCNC: 141 MMOL/L (ref 133–144)
TRIGL SERPL-MCNC: 158 MG/DL
TSH SERPL DL<=0.005 MIU/L-ACNC: 3.47 MU/L (ref 0.4–4)

## 2020-01-07 PROCEDURE — 76536 US EXAM OF HEAD AND NECK: CPT

## 2020-01-10 ENCOUNTER — TELEPHONE (OUTPATIENT)
Dept: FAMILY MEDICINE | Facility: CLINIC | Age: 45
End: 2020-01-10

## 2020-01-10 PROBLEM — E04.1 THYROID NODULE: Status: ACTIVE | Noted: 2020-01-10

## 2020-01-10 NOTE — TELEPHONE ENCOUNTER
Please let patient know that his thyroid ultrasound showed a few nodules, the largest one was 5.7 cm.  Due to the larger size of the nodule I would like him to follow up with Endocrinology and I placed a referral.    Your provider has referred you to: [place]:  [place] Lexi Davidson [phone]  [email or web address] [email or web address] DNP, APRN, CNP

## 2020-01-13 NOTE — TELEPHONE ENCOUNTER
Called patient and provided message below as per Juliana Florentino, LÓPEZ, APRN, CNP.  Patient verbalized understanding. Phone number for scheduling provided.    Nadir Chang RN

## 2021-03-30 ENCOUNTER — NURSE TRIAGE (OUTPATIENT)
Dept: FAMILY MEDICINE | Facility: CLINIC | Age: 46
End: 2021-03-30

## 2021-03-30 ENCOUNTER — VIRTUAL VISIT (OUTPATIENT)
Dept: FAMILY MEDICINE | Facility: CLINIC | Age: 46
End: 2021-03-30
Payer: COMMERCIAL
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
## 2021-03-30 DIAGNOSIS — R21 RASH: ICD-10-CM

## 2021-03-30 PROCEDURE — 99213 OFFICE O/P EST LOW 20 MIN: CPT | Mod: TEL | Performed by: NURSE PRACTITIONER

## 2021-03-30 RX ORDER — PREDNISONE 10 MG/1
TABLET ORAL
Qty: 20 TABLET | Refills: 0 | Status: SHIPPED | OUTPATIENT
Start: 2021-03-30

## 2021-03-30 NOTE — PROGRESS NOTES
Olivier is a 45 year old who is being evaluated via a billable video visit.      How would you like to obtain your AVS? Mail a copy  If the video visit is dropped, the invitation should be resent by: Text to cell phone:   Will anyone else be joining your video visit? No      Video Start Time: 1244    Assessment & Plan     Bug bite, initial encounter  Again, rash difficult to assess due to clarity of video visit however rash doesn't appear to be shingles or secondary bacterial skin infection at this time. Discussed trying OTC allergy medication for the next few days and if no improvement then start oral prednisone. Discussed possible side effects of both medications. If symptoms worsen he should be re-evaluated. He does not appear to be in acute distress at this time.   - predniSONE (DELTASONE) 10 MG tablet; Take 3 tabs by mouth daily x 3 days, then 2 tabs daily x 3 days, then 1 tab daily x 3 days, then 1/2 tab daily x 3 days.    Rash  See above  - predniSONE (DELTASONE) 10 MG tablet; Take 3 tabs by mouth daily x 3 days, then 2 tabs daily x 3 days, then 1 tab daily x 3 days, then 1/2 tab daily x 3 days.    Return in about 1 week (around 4/6/2021) for If symptoms worsen or fail to improve.    Salome Aguilar NP  [facility]   Olivier is a 45 year old who presents for the following health issues     HPI     Rash  Onset/Duration:started yesterday 03/29  Description  has a red rash on his arm that started yesterday.  Stated it started like a bug bite to his wrist but has spread onto his entire arm and neck  Location: started with Left arm, but has now spread onto his entire arm and neck  Itching: mild  Intensity:    Progression of Symptoms:  Worsening, spreading   Accompanying signs and symptoms:   Fever: no  Body aches or joint pain: no  Sore throat symptoms: no  Recent cold symptoms: no  History:           Previous episodes of similar rash: None  New exposures:  None   Recent  travel: no  Exposure to similar rash: no  Precipitating or alleviating factors:   Therapies tried and outcome: applied cortisone cream last night.cream calmed down the itch, but itch returned today.     Denies fevers. Rash is not hot to touch. Denies shortness of breath or runny nose. He states no one else at home has a similar rash at this time but his daughter had something similar several months ago that resolved after a few days.       Review of Systems   Constitutional, HEENT, cardiovascular, pulmonary, gi and gu systems are negative, except rash       Objective           Vitals:  No vitals were obtained today due to virtual visit.    Physical Exam   GENERAL: Healthy, alert and no distress  EYES: Eyes grossly normal to inspection.  No discharge or erythema, or obvious scleral/conjunctival abnormalities.  RESP: No audible wheeze, cough, or visible cyanosis.  No visible retractions or increased work of breathing.    SKIN: rash difficult to fully assess due to clarity of video visit however it appears that pt has a maculopapular rash that starts to medial left wrist and continues up to left neck.   NEURO: Cranial nerves grossly intact.  Mentation and speech appropriate for age.  PSYCH: Mentation appears normal, affect normal/bright, judgement and insight intact, normal speech and appearance well-groomed.      Video-Visit Details    Type of service:  Video Visit    Video End Time: 1251    Originating Location (pt. Location): Home    Distant Location (provider location):  [facility]     Platform used for Video Visit: Kristal

## 2021-03-30 NOTE — TELEPHONE ENCOUNTER
Care Advice:  To be seen in clinic today/tomorrow.  Patient offered in person appointment tomorrow.  Patient wanting to be seen today.  No available  in person appointments in [facility] or [place]. Patient scheduled for  virtual visit.        RN spoke with patient.  Patient stated he has a red rash on his arm that started yesterday.  Stated it started like a bug bite but has spread onto his entire arm and neck.  Denied any other symptoms.  Patient stated he applied  cortisone cream last night..Rashitches and patient rated it as 1.5/10        Additional Information    Negative: Sudden onset of rash (within last 2 hours) and difficulty with breathing or swallowing    Negative: Difficult to awaken or acting confused (e.g., disoriented, slurred speech)    Negative: Fever and purple or blood-colored spots or dots    Negative: Too weak or sick to stand    Negative: Life-threatening reaction (anaphylaxis) in the past to similar substance (e.g., food, insect bite/sting, chemical, etc.) and < 2 hours since exposure    Negative: Sounds like a life-threatening emergency to the triager    Negative: Insect bites suspected    Negative: Sunburn suspected    Negative: Hives suspected    Negative: Drug rash suspected and started taking new medicine within last 2 weeks (EXCEPTION: antihistamine, eye drops, ear drops, decongestant or other OTC cough/cold medicines)    Negative: Bright red, sunburn-like rash and current tampon use    Negative: Bright red, sunburn-like rash and current tampon use or nasal packing    Negative: Bright red, sunburn-like rash and wound infection or recent surgery    Negative: Bright red skin that peels off in sheets    Negative: Stiff neck (can't touch chin to chest)    Negative: Patient sounds very sick or weak to the triager    Negative: Fever    Negative: Face becomes swollen    Negative: Headache    Negative: Purple or blood-colored spots or dots (no fever and sounds well to  "triager)    Negative: Joint pain or swelling    Negative: Sores in mouth    Negative: Rash looks like large or small blisters (i.e., fluid filled bubbles or sacs on the skin)    Negative: Pregnant    Negative: Rash began within 4 hours of a new prescription medication    Patient wants to be seen    Answer Assessment - Initial Assessment Questions  1. APPEARANCE of RASH: \"Describe the rash.\" (e.g., spots, blisters, raised areas, skin peeling, scaly)      Red rash raised  2. SIZE: \"How big are the spots?\" (e.g., tip of pen, eraser, coin; inches, centimeters)      Pen tip  3. LOCATION: \"Where is the rash located?\"     Started on left arm and now spread up to his neck and down his arm.  Looked like a bug bite.    4. COLOR: \"What color is the rash?\" (Note: It is difficult to assess rash color in people with darker-colored skin. When this situation occurs, simply ask the caller to describe what they see.)      Red small dots with redness around    5. ONSET: \"When did the rash begin?\"      Yesterday morning.    6. FEVER: \"Do you have a fever?\" If so, ask: \"What is your temperature, how was it measured, and when did it start?\"     No fever.  No swelling    7. ITCHING: \"Does the rash itch?\" If so, ask: \"How bad is the itch?\" (Scale 1-10; or mild, moderate, severe)    It has started itching.  1/2  8. CAUSE: \"What do you think is causing the rash?\"          9. MEDICATION FACTORS: \"Have you started any new medications within the last 2 weeks?\" (e.g., antibiotics)       no  10. OTHER SYMPTOMS: \"Do you have any other symptoms?\" (e.g., dizziness, headache, sore throat, joint pain)        Denies any symptoms.    Used cortisone cream last night    11. PREGNANCY: \"Is there any chance you are pregnant?\" \"When was your last menstrual period?\"        Na    Protocols used: RASH OR REDNESS - WIDESPREAD-A-OH      [place] Arshad, RN, BSN, PHN  [facility]    "

## 2021-03-30 NOTE — PATIENT INSTRUCTIONS
Patient Education     Insect Bite  Some insects sting to protect themselves or their nests. These include bees, wasps, ants, and hornets. A sting causes a sharp burning pain. Other insects bite to feed. These include fleas, bedbugs, and mosquitoes. In some cases, the actual bite causes no pain. But after the bite there may be a local reaction. Both bites and stings can cause a local reaction that is an itchy red welt or swelling at the site. Most insect bites and stings don't cause illness. And the itching and swelling most often go away without treatment. But an infection can develop if the bite is scratched and the skin broken.   If a stinger is visible at the bite spot, remove it as quickly as possible. This can reduce the amount of venom that gets into your body. Scrape it out with a dull edge, such as the edge of a credit card. Try not to squeeze it. Don't try to dig it out. You may damage the skin and also increase the chance of infection.   In rare cases, a person may have an allergic reaction to an insect bite or sting. You can have a severe allergic reaction the first time you are bitten or stung. Severe allergic reactions are called anaphylaxis. This is a medical emergency. If you have symptoms listed below after a bite or a sting, have someone call 911.   Symptoms of an allergic reaction often develop quickly and include:     Skin symptoms, such as hives, redness, or swelling away from the area that was stung. For example, the face or lips may swell after being stung on the hand.    Belly cramps, nausea, vomiting, or diarrhea    Hoarse voice, shortness of breath, and trouble breathing    Lightheadedness, dizziness, or passing out     To help reduce swelling and itching, apply a cold pack or ice in a zip-top plastic bag wrapped in a thin towel.   Home care    For local reactions to stings or bites, your healthcare provider may prescribe over-the-counter medicines such as calamine lotion or antihistamines.  These can help ease itching and swelling. Use each medicine according to the directions on the package. If the sting or bite gets infected, you will need an antibiotic. This may be in pill form taken by mouth. Or it may be as an ointment or cream put directly on the skin. Be sure to use them exactly as prescribed.    Bite symptoms often go away on their own in a week or two.    To help prevent infection, don't scratch or pick at the bite.    To help ease itching and swelling, apply ice to the bites. Do this for up to 10 minutes at a time. Don't take hot showers or baths. These often make itching worse. To make an ice pack, put ice cubes in a zip-top plastic bag that seals at the top. Wrap the bag in a clean, thin towel or cloth. Never put ice or an ice pack directly on the skin.    If you think you have insects in your home, talk with a licensed pest-control professional. He or she can inspect your home and tell you how to get rid of bugs safely.    Follow-up care  Follow up with your healthcare provider, or as advised.  Call 911  Call 911 if any of these occur:     Trouble breathing or swallowing    Wheezing    Feeling like your throat is closing up    Fainting, loss of consciousness    Swelling around the face or mouth  When to get medical advice  Call your healthcare provider right away if any of these occur:    Fever of 100.4 F (38 C) or higher, or as directed by your healthcare provider    Signs of infection, such as increased swelling and pain, warmth, red streaks, or drainage from the skin    Signs of allergic reaction, such as hives, a spreading rash, or throat itching  [employer] last reviewed this educational content on 8/1/2019 2000-2020 [employer]. All rights reserved. This information is not intended as a substitute for professional medical care. Always follow your healthcare professional's instructions.

## 2021-08-30 ENCOUNTER — APPOINTMENT (OUTPATIENT)
Dept: MRI IMAGING | Facility: HOSPITAL | Age: 46
End: 2021-08-30
Attending: EMERGENCY MEDICINE
Payer: COMMERCIAL

## 2021-08-30 ENCOUNTER — HOSPITAL ENCOUNTER (EMERGENCY)
Facility: HOSPITAL | Age: 46
Discharge: HOME | End: 2021-08-30
Attending: EMERGENCY MEDICINE | Admitting: EMERGENCY MEDICINE
Payer: COMMERCIAL

## 2021-08-30 VITALS
DIASTOLIC BLOOD PRESSURE: 88 MMHG | BODY MASS INDEX: 29.44 KG/M2 | RESPIRATION RATE: 18 BRPM | TEMPERATURE: 98.2 F | OXYGEN SATURATION: 97 % | WEIGHT: 229.28 LBS | HEART RATE: 62 BPM | SYSTOLIC BLOOD PRESSURE: 151 MMHG

## 2021-08-30 DIAGNOSIS — M48.02 NEURAL FORAMINAL STENOSIS OF CERVICAL SPINE: ICD-10-CM

## 2021-08-30 DIAGNOSIS — E04.1 THYROID NODULE: ICD-10-CM

## 2021-08-30 DIAGNOSIS — R20.2 PARESTHESIA OF HAND, BILATERAL: ICD-10-CM

## 2021-08-30 PROCEDURE — 72141 MRI NECK SPINE W/O DYE: CPT

## 2021-08-30 PROCEDURE — 99285 EMERGENCY DEPT VISIT HI MDM: CPT | Mod: 25

## 2021-08-30 PROCEDURE — 70551 MRI BRAIN STEM W/O DYE: CPT

## 2021-08-30 NOTE — Clinical Note
Olivier Cheema was seen and treated in our emergency department on 8/30/2021.  He may return to work on 08/31/2021.       If you have any questions or concerns, please don't hesitate to call.      Nina Gavin, DO

## 2021-08-30 NOTE — ED TRIAGE NOTES
"Pt states when he was at work this morning at 0230 he was hit in back of the head with a steel gate.  Pt denies loss of consciousness and denies blood thinners.  Pt also denies headache.  Pt's only complaint is intermittent hand \"prickliness\" feeling which lasts one minute at a time which started between 0600 and 0700 this morning.  "

## 2021-08-30 NOTE — ED PROVIDER NOTES
EMERGENCY DEPARTMENT ENCOUNTER      NAME: Olivier Cheema  AGE: 46 year old male  YOB: 1975  MRN: [id number]  EVALUATION DATE & TIME: 2021  6:11 PM    PCP: System, Provider Not In    ED PROVIDER: Nina Gavin D.O.      CHIEF COMPLAINT:  Chief Complaint   Patient presents with     Intermittent bilateral hand numbness         FINAL IMPRESSION:  1. Paresthesia of hand, bilateral    2. Neural foraminal stenosis of cervical spine    3. Thyroid nodule          ED COURSE & MEDICAL DECISION MAKIN-year-old male who is otherwise healthy presented to the ED for bilateral hand paresthesias after he was struck in the back of the head by a metal gate while at work.  Patient denies losing consciousness and he states that the gait struck his head and not his neck.  Initially the patient had a very mild headache but he states that this has since resolved.  The patient reported bilateral hand paresthesias that started a few hours later and have progressively increased.  He denied any weakness or loss of sensation in his bilateral hands, however.  The patient had no other complaints here in the ED.  On arrival to the ED the patient was hemodynamically stable.  He did not appear to be in any obvious distress or discomfort.  On exam the patient had subjective paresthesias noted over the palmar surfaces of both hands and all 5 fingers.  He had no other subjective sensory deficits in the bilateral upper extremities and the patient had normal strength in the bilateral upper and lower extremities.  No other signs of trauma or injury noted on exam.       MRI of the patient's brain was nondiagnostic.  MRI of the cervical spine did not show evidence of traumatic injury or central cord syndrome.  There was severe left neuroforaminal stenosis at C4-5 and severe right neuroforaminal stenosis at C5-6.  The MRI also reported a thyroid nodule on the left.    Patient was reevaluated informed of the MRI results.  Patient  "was educated about neuroforaminal stenosis and he was informed that these findings are not likely the cause of his bilateral hand paresthesias.  The patient admitted to experiencing sporadic episodes of sharp neck pain that occur with neck movements.  Patient was informed that this pain is more indicative of neuroforaminal stenosis rather than the bilateral hand paresthesias.  The patient felt comfortable returning home.  The patient was instructed to follow-up with his primary care physician for reevaluation or to return back to ED sooner for any worsening numbness, ting, weakness in his extremities, headaches, or any other new or concerning symptoms.    6:17 PM I performed my initial history and physical exam as well as discussed ED course and plan.    9:53 PM I updated the patient and prepped him for discharge.    At the conclusion of the encounter I discussed the results of all of the tests and the disposition. The questions were answered. The patient or family acknowledged understanding and was agreeable with the care plan.     MEDICATIONS GIVEN IN THE EMERGENCY:  Medications - No data to display    NEW PRESCRIPTIONS STARTED AT TODAY'S ER VISIT:  Discharge Medication List as of 8/30/2021  9:52 PM             =================================================================    HPI  Olivier Cheema is a 46 year old male with a pertinent hx of numbness of upper extremities who presents via private car with his daughter for evaluation of intermittent bilateral hand numbness.     Patient reports being at work this morning at 0230 and being hit in the upper back of the head with a steel gate. He denies any loss of consciousness or being on any blood thinners. Patient does endorse that he felt his ears ringing and some eye pressure 20 minutes after the event and was sent home at this time. He then says that his palms started feeling \"prickly\" bilaterally starting between 0600 and 0700. Patient's prickliness  lasts " "a minute at a time and has become progressively more persistent as the day went on. He is still feeling this sensation in his hands but denies any tinglings to the back of his hand . Patient denies tinglings in lower extremities, tingling to forearms, weakness, loss of sensation, headache, vision changes, nausea, chest pain, shortness of breath, dizziness, or any other symptoms at this time. He \"feels perfectly fine\" other than his hand tingliness.    SHx: Patient is a current everyday smoker and has a family history of throat cancer.     .I, Byron Powers am serving as a scribe to document services personally performed by Dr. Nina Gavin DO, based on my observation and the provider's statements to me. I, Dr. Nina Gavin DO attest that Byron Powers is acting in a scribe capacity, has observed my performance of the services and has documented them in accordance with my direction.      REVIEW OF SYSTEMS   Eyes: Denies visual changes, positive for eye pressure directly after event  HENT: Denies neck pain, positive for ear ringing after event  Respiratory: Denies  shortness of breath    Cardiovascular: Denies chest pain  GI: Denies nausea,      Musculoskeletal: Denies any head pain  Neurologic: Denies current headache, new weakness, loss of sensation, dizziness, loss of consciousness, tingling in forearms or LE. Positive for bilateral palm tingling and numbness        Remainder of systems reviewed, unless noted in HPI all others negative.      PAST MEDICAL HISTORY:  No past medical history on file.    PAST SURGICAL HISTORY:  No past surgical history on file.        CURRENT MEDICATIONS:    Prior to Admission medications    Medication Sig Start Date End Date Taking? Authorizing Provider   predniSONE (DELTASONE) 10 MG tablet Take 3 tabs by mouth daily x 3 days, then 2 tabs daily x 3 days, then 1 tab daily x 3 days, then 1/2 tab daily x 3 days. 3/30/21   Salome Aguilar NP         ALLERGIES:  No Known " Allergies    FAMILY HISTORY:  Family History   Problem Relation Age of Onset     No Known Problems Mother      Throat cancer Father      Asthma Other         cousin     Lung Cancer Maternal Uncle         smoker     Thyroid Disease No family hx of      Prostate Cancer No family hx of        SOCIAL HISTORY:   Social History     Socioeconomic History     Marital status: Single     Spouse name: Not on file     Number of children: Not on file     Years of education: Not on file     Highest education level: Not on file   Occupational History     Not on file   Tobacco Use     Smoking status: Current Every Day Smoker     Smokeless tobacco: Never Used   Substance and Sexual Activity     Alcohol use: No     Drug use: No     Sexual activity: Not on file   Other Topics Concern     Parent/sibling w/ CABG, MI or angioplasty before 65F 55M? Not Asked   Social History Narrative     Not on file     Social Determinants of Health     Financial Resource Strain:      Difficulty of Paying Living Expenses:    Food Insecurity:      Worried About Running Out of Food in the Last Year:      Ran Out of Food in the Last Year:    Transportation Needs:      Lack of Transportation (Medical):      Lack of Transportation (Non-Medical):    Physical Activity:      Days of Exercise per Week:      Minutes of Exercise per Session:    Stress:      Feeling of Stress :    Social Connections:      Frequency of Communication with Friends and Family:      Frequency of Social Gatherings with Friends and Family:      Attends Hindu Services:      Active Member of Clubs or Organizations:      Attends Club or Organization Meetings:      Marital Status:    Intimate Partner Violence:      Fear of Current or Ex-Partner:      Emotionally Abused:      Physically Abused:      Sexually Abused:        PHYSICAL EXAM    BP (!) 151/88   Pulse 62   Temp 98.2  F (36.8  C) (Oral)   Resp 18   Wt 104 kg (229 lb 4.5 oz)   SpO2 97%   BMI 29.44 kg/m    General Presentation:  No apparent distress.  ENT: Ears atraumatic. Ear canals clear. No blood or CSF in canals. No hemotympanum or tympanic membrane rupture. Nose atraumatic/non-tender. No septal hematoma. Face/mandible non-tender. Oropharynx clear.  Eye: Pupils equal round and reactive to light. EOMFI. No conjunctival hemorrhage. No hyphema. Eye lids normal.  Pulmonary: Spontaneous respiration. No respiratory distress. Clear equal breath sounds. Airway patent. Speech is normal. No stridor. Grossly stable chest wall. No crepitus. No chest wall tenderness to palpation noted.  Circulatory: Regular rate and rhythm. No murmurs, rubs, or gallops. Normal capillary refill.   Abdominal: Abdomen soft, non-tender and non-distended. No peritoneal signs. No flank tenderness. Normal bowel sounds. Pelvis stable/non-tender.   Neurologic: Alert and awake. Cranial nerves II-XII grossly intact.  [place] Coma Score 15. Strength in bilateral upper extremities is 5/5 with bilateral arm extension, elbow flexion extension, opposition, finger abduction and abduction.  No subjective sensory discrepancies noted to light touch in the bilateral upper extremities.  Subjective paresthesias noted in all 5 fingers and palmar surfaces of both hands.  Spine: No bony tenderness to palpation in the cervical spine, thoracic spine, lumbar spine, or sacrum.     Upper extremities:  Full range of motion. No tenderness to palpation.  Pulses 2/4 bilateral upper extremities . No wounds, abrasions, lacerations, or contusions noted.   Lower extremities:  Full range of motion. No tenderness to palpation.  Pulses 2/4 bilateral lower extremities . No wounds, abrasions, lacerations, or contusions noted.   Skin:. No wounds, abrasions, lacerations, or contusions of head/scalp, chest, back, abdomen, flank or pelvis.  Head/scalp non tender or hematoma    LAB:  All pertinent labs reviewed and interpreted.  Labs Ordered and Resulted from Time of ED Arrival Up to the Time of Departure from the  ED - No data to display    RADIOLOGY:  Reviewed all pertinent imaging. Please see official radiology reports  Cervical spine MRI w/o contrast   Final Result   IMPRESSION:   1.  Incompletely visualized 5.4 x 5.5 cm heterogeneous left thyroid lobe nodule. This can be further evaluated with dedicated ultrasound.       2.  No MRI evidence of traumatic injury to the cervical spine. No bone edema or evidence of ligamentous injury.      3.  At C4-C5, there is moderate right and severe left neural foraminal stenosis.      4.  At C5-C6, there is severe right and mild to moderate left neural foraminal stenosis         MR Brain w/o Contrast   Final Result   IMPRESSION:   1. No discrete mass lesion, hemorrhage or focal area suggestive of acute infarct ischemia.   2. No abnormal signal within brain parenchyma.            I, Byorn Powers , am serving as a scribe to document services personally performed by Dr. Nina Gavin based on my observation and the provider's statements to me. INina, DO attest that Byron Powers is acting in a scribe capacity, has observed my performance of the services and has documented them in accordance with my direction.    Nina Gavin D.O.  Emergency Medicine  [facility] EMERGENCY DEPARTMENT  [facility] [facility] [id number] [phone]  Dept: [phone]         Nina Gavin DO  08/31/21 0034

## 2021-08-31 NOTE — DISCHARGE INSTRUCTIONS
The MRI of your brain and cervical spine did not show any evidence of traumatic injury or spinal cord trauma.  The MRI of the cervical spine did note foraminal stenosis on both the left and right sides which could cause intermittent pain.  The MRI also noted a left thyroid nodule.  It is recommended that you follow-up with your care physician to arrange an outpatient ultrasound for further evaluation of the thyroid nodule.  Continue to rest and use over-the-counter ibuprofen as needed for any further discomfort.  Return back to ED sooner for any worsening numbness, tingling, or weakness in your arms or any other new or concerning symptoms.

## 2023-11-09 ENCOUNTER — NURSE TRIAGE (OUTPATIENT)
Dept: NURSING | Facility: CLINIC | Age: 48
End: 2023-11-09
Payer: COMMERCIAL

## 2023-11-09 NOTE — TELEPHONE ENCOUNTER
"Pt reports \"discharge from L eye.\"  \"Onset a couple days ago.\"  \"Woke up with crusty drainage at first.\"  Now -> \"Starting to go to the other eye today.\"  \"Both eyes now goopy and feel moist.\"  \"Drainage gathers in the corners -> \"thick.\"    No accompanying cold symptoms.  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  Discussed purulent eye drainage can occur from inadvertently rubbing eyes with unwashed hands.    Pt has no PCP within [place].  Therefore, attempted with a  to find a clinic appointment for pt's symptoms.  However, no open appt slots at any feasibly located [place] [facility].  Offered various urgent care locations.  However pt prefers to try home care measures and if no improvement after 24 hours, will seek urgent care eval.    (Home care measures discussed at length -> warm moist cottonball treatments frequently as feasible when at home, washing entire face and eyes w/baby shampoo morning and night, using paper towels in bathroom instead of common hand towels).    Naila Baeza  [facility] Nurse Advisor     Reason for Disposition   Eye with yellow or green discharge or eyelashes stick together, but NO standing order to call in antibiotic eye drops  (Exception: Carleen; continue triage.)     No PCP within [facility].    Additional Information   Negative: Eye exposure to chemical or fumes   Negative: Redness of white of eye (sclera), but no pus or only a small amount of brief pus   Negative: SEVERE pain (e.g., excruciating)   Negative: Patient sounds very sick or weak to the triager   Negative: MODERATE eye pain (e.g., interferes with normal activities)   Negative: Looking at light causes MODERATE to SEVERE eye pain (i.e., photophobia)   Negative: Blurred vision   Negative: Cloudy spot or sore seen on the cornea (clear part of the eye)   Negative: Eyelids are " very swollen (shut or almost)   Negative: Eyelid (outer) is very red and painful (or tender to touch)   Negative: Fever > 103 F (39.4 C)   Negative: MILD eye pain or discomfort and wears contacts   Negative: Using antibiotic eyedrops > 3 days but pus persists   Negative: Lots of yellow or green nasal discharge   Negative: Discharge from penis   Negative: Weak immune system (e.g., HIV positive, cancer chemo, splenectomy, organ transplant, chronic steroids)   Negative: New or abnormal vaginal discharge   Negative: Patient wants to be seen   Negative: Fever present > 3 days (72 hours)   Negative: Bleeding on white of the eye    Protocols used: Eye - Pus or [id number]